# Patient Record
Sex: MALE | Race: WHITE
[De-identification: names, ages, dates, MRNs, and addresses within clinical notes are randomized per-mention and may not be internally consistent; named-entity substitution may affect disease eponyms.]

---

## 2017-06-13 ENCOUNTER — HOSPITAL ENCOUNTER (OUTPATIENT)
Dept: HOSPITAL 75 - RAD | Age: 82
End: 2017-06-13
Attending: INTERNAL MEDICINE
Payer: MEDICARE

## 2017-06-13 DIAGNOSIS — R91.1: Primary | ICD-10-CM

## 2017-06-14 NOTE — DIAGNOSTIC IMAGING REPORT
EXAMINATION: PET-CT 



TECHNIQUE: 

Serum glucose level at the time of the study is: 56 mg/dL.

 11 mCi of FDG was administered intravenously followed by

obtaining PET images with corresponding noncontrast CT scan

images. The CT scan was performed for anatomic correlation and

attenuation correction and was not performed according to the

diagnostic protocol of the areas covered. The scan was performed

from the head to mid thighs.



INDICATION: Left lower lobe lung mass.



FINDINGS:



There is symmetric FDG uptake seen in the brain.



There is no suspicious hypermetabolic activity in the neck seen.



The left lower lobe pulmonary nodule is seen with normal FDG

uptake with associated SUV of about 1.5. It measures the 1.4 cm

in size and when correlated with CT of 8/27/2013, it demonstrates

questionable minimal increase in size. These findings are in

favor of benign etiology such as a hamartoma or noncalcified

granuloma. There is background emphysema changes in the lungs.



IN THE ABDOMEN AND PELVIS:

There is likely physiologic activity seen in bowel loops with

nonfocal uptake mostly in the colon. There is also urinary tract

excretion, expected with no hypermetabolic mass identified.



IMPRESSION: The left lower lobe pulmonary nodule measuring 1.4 cm

is not associated with significant FDG uptake in favor of benign

etiology. It demonstrates minimal growth compared to 2013 CT

scan. It is still favored to be benign such as hamartoma or

noncalcified granuloma. Given the minimal growth, a followup

study in six months with low dose unenhanced CT scan of the chest

is recommended.



Dictated by: 



  Dictated on workstation # [id number]

## 2017-06-22 ENCOUNTER — HOSPITAL ENCOUNTER (OUTPATIENT)
Age: 82
End: 2017-06-22
Payer: MEDICARE

## 2017-06-22 DIAGNOSIS — Z01.818: Primary | ICD-10-CM

## 2017-06-26 ENCOUNTER — HOSPITAL ENCOUNTER (OUTPATIENT)
Dept: HOSPITAL 75 - ENDO | Age: 82
Discharge: HOME | End: 2017-06-26
Attending: SURGERY
Payer: MEDICARE

## 2017-06-26 VITALS — BODY MASS INDEX: 23.54 KG/M2 | HEIGHT: 67 IN | WEIGHT: 150 LBS

## 2017-06-26 VITALS — SYSTOLIC BLOOD PRESSURE: 142 MMHG | DIASTOLIC BLOOD PRESSURE: 64 MMHG

## 2017-06-26 VITALS — SYSTOLIC BLOOD PRESSURE: 139 MMHG | DIASTOLIC BLOOD PRESSURE: 70 MMHG

## 2017-06-26 VITALS — SYSTOLIC BLOOD PRESSURE: 124 MMHG | DIASTOLIC BLOOD PRESSURE: 68 MMHG

## 2017-06-26 VITALS — DIASTOLIC BLOOD PRESSURE: 68 MMHG | SYSTOLIC BLOOD PRESSURE: 124 MMHG

## 2017-06-26 DIAGNOSIS — E11.9: ICD-10-CM

## 2017-06-26 DIAGNOSIS — M19.90: ICD-10-CM

## 2017-06-26 DIAGNOSIS — J44.9: ICD-10-CM

## 2017-06-26 DIAGNOSIS — G47.33: ICD-10-CM

## 2017-06-26 DIAGNOSIS — R63.4: ICD-10-CM

## 2017-06-26 DIAGNOSIS — I10: ICD-10-CM

## 2017-06-26 DIAGNOSIS — I25.10: ICD-10-CM

## 2017-06-26 DIAGNOSIS — K21.0: ICD-10-CM

## 2017-06-26 DIAGNOSIS — E78.5: ICD-10-CM

## 2017-06-26 DIAGNOSIS — K22.2: Primary | ICD-10-CM

## 2017-06-26 PROCEDURE — 87101 SKIN FUNGI CULTURE: CPT

## 2017-06-26 RX ADMIN — MIDAZOLAM HYDROCHLORIDE PRN MG: 1 INJECTION, SOLUTION INTRAMUSCULAR; INTRAVENOUS at 11:23

## 2017-06-26 RX ADMIN — MIDAZOLAM HYDROCHLORIDE PRN MG: 1 INJECTION, SOLUTION INTRAMUSCULAR; INTRAVENOUS at 11:20

## 2017-06-26 RX ADMIN — FENTANYL CITRATE PRN MCG: 50 INJECTION, SOLUTION INTRAMUSCULAR; INTRAVENOUS at 11:24

## 2017-06-26 RX ADMIN — FENTANYL CITRATE PRN MCG: 50 INJECTION, SOLUTION INTRAMUSCULAR; INTRAVENOUS at 11:21

## 2017-06-26 NOTE — XMS REPORT
Continuity of Care Document

 Created on: 2017



Brock Licona

External Reference #: [id number]

: 1935

Sex: Male



Demographics







 Address  [address]

[place]  [address]

 

 Home Phone  [phone]

 

 Preferred Language  English

 

 Marital Status  

 

 Presybeterian Affiliation  CHR

 

 Race  White or 

 

 Ethnic Group  Not  or 





Author







 Author  [facility]

 

 Organization  [facility]

 

 Address  Unknown

 

 Phone  Unavailable







Support







 Name  Relationship  Address  Phone

 

 , Isha Reese  [address]

[place]  [address]  [phone]







Care Team Providers







 Care Team Member Name  Role  Phone

 

 Lopez Elder III  PCP  [phone]







Source Comments

Some departments are not documenting in the electronic medical record.  If you 
do not see the information that you expected, contact Release of Information in 
the Health Information Management department at [phone] for further 
assistance in locating additional records.[facility]



Active Allergies and Adverse Reactions







    



  Allergen   Noted Date   Severity   Reactions   Comments

 

    



  Sulfamethoxazole-Trimetho   2015   Medium   RASH 



  prim    







Current Medications







      



  Prescription   Sig.   Disp.   Refills   Start   End Date   Status



      Date  

 

      



  gemfibrozil (LOPID) 600   Take 600 mg by mouth       Active



  mg tablet   twice daily.     

 

      



  glimepiride (AMARYL) 4 mg   Take 2 mg by mouth twice       Active



  tablet   daily.     

 

      



  lisinopril (PRINIVIL;   Take 10 mg by mouth at       Active



  ZESTRIL) 10 mg tablet   bedtime daily.     

 

      



  fluticasone-salmeterol   Inhale 1 Puff by mouth       Active



  (ADVAIR DISKUS) 100-50   every 12 hours.     



  mcg inhalation disk      

 

      



  vitamins, multiple cap   Take 1 Cap by mouth       Active



   daily.     

 

      



  metFORMIN (GLUCOPHAGE)   Take 1,000 mg by mouth       Active



  500 mg tablet   daily with dinner. Takes     



   500mg with breakfast and     



   1000mg with dinner.     

 

      



  niacin SR (SLO-NIACIN)   Take 750 mg by mouth at       Active



  750 mg Tb24 tablet   bedtime daily.     

 

      



  Aspirin 81 mg Tab   Take 1 Tab by mouth     20    Active



   daily.     12  

 

      



  atorvastatin (LIPITOR) 10   Take 1 Tab by mouth   90 Tab   3   20    
Active



  mg tablet   daily.     12  

 

      



  pantoprazole DR   Take 40 mg by mouth       Active



  (PROTONIX) 40 mg tablet   daily.     







Active Problems







 



  Problem   Noted Date

 

 



  Abnormal cardiovascular stress test   2012

 

 



  HTN (hypertension)   2012

 

 



  COPD (chronic obstructive pulmonary disease) ([facility])   2012

 

 



  HLD (hyperlipidemia)   2012

 

 



  Preoperative cardiovascular examination   2012

 

 



  Aortic heart murmur   2012

 

 



  Peripheral artery disease ([facility])   2012

 

 



  Overview:



  Lower extremity thigh claudication

 

 



  History of endovascular stent graft for abdominal aortic aneurysm   2012

 

 



  Overview:



  Stent placed 

 

 



  CAD (coronary artery disease)   2012

 

 



  Overview:



  [employer] myoview 12 [facility]



  Inferior and inferolateral reversibility



  EF 57%





  12: Bare metal stent PCI to CFX and OMB; 60% LAD, negative by FFR,



  100% occluded RCA - per cath by Dr. Schilling

 

 



  Cancer of kidney ([facility])   10/29/2012

 

 



  Overview:



  Left renal mass noted during AAA surveillance.



  Underwent Left Robotic Partial Nephrectomy on 1/15/2013.



  Final pathology clear cell renal cell carcinoma pT1a, Burke 3, margins



  negative.



  8/17/15:  No evidence of recurrence on ultrasound, labs



  16: No evidence of recurrence on history, exam, labs, CXR, or CT scan





  L



  ast Assessment & Plan:



  Patient doing well.  No evidence of cancer recurrence.  He is now 3 years



  out from resection of  pT1a cancer.  Per AUA and NCCN guidelines, he needs



  no further surveillance imaging for renal cell carcinoma.  He just needs



  yearly physical and exam which may be done by his PCP.  He will follow-up



  with me prn.  Patient is very pleased.



  -- F/U with me prn



  -- Yearly exam and labs by his PCP



  -- All questions answered

 

 



  Type II diabetes mellitus (HCC) 

 

 



  Erectile dysfunction 







Social History







    



  Tobacco Use   Types   Packs/Day   Years Used   Date

 

    



  Former Smoker   Cigarettes   1    Quit: 1996

 

    



  Smokeless Tobacco: Never   



  Used   









   



  Alcohol Use   Drinks/Week   oz/Week   Comments

 

   



  No     very seldom







Last Filed Vital Signs







  



  Vital Sign   Reading   Time Taken

 

  



  Blood Pressure   157/67   2016 11:30 AM CDT

 

  



  Pulse   63   2016 11:30 AM CDT

 

  



  Temperature   36.4   C (97.5   F)   2013 11:00 AM CST

 

  



  Respiratory Rate   -   -

 

  



  Height   1.702 m (5' 7.01")   2016 11:30 AM CDT

 

  



  Weight   77.202 kg (170 lb 3.2 oz)   2016 11:30 AM CDT

 

  



  Body Mass Index   26.65   2016 11:30 AM CDT

 

  



  Oxygen Saturation   97%   2013 11:00 AM CST







Plan of Care







   



  Health Maintenance   Due Date   Last Done   Comments

 

   



  Physical (Comprehensive)   1944  



  Exam   

 

   



  Pertussis Vaccine   1948  

 

   



  Tetanus Vaccine   1954  

 

   



  Dilated Eye Exam   1955  

 

   



  Foot Exam   1955  

 

   



  Hba1c   1955  

 

   



  Microalbumin   1955  

 

   



  Shingles Vaccine   1997  

 

   



  Prevnar/Pneumovax (#1)   2002  

 

   



  Influenza Vaccine   2017  







Results from Last 3 Months

Not on file

## 2017-06-26 NOTE — XMS REPORT
Continuity of Care Document

 Created on: 2017



JOE FERMIN

External Reference #: [id number]

: 1935

Sex: Male



Demographics







 Address  [address] [place] [address]

 

 Home Phone  [phone]

 

 Preferred Language  Unknown

 

 Marital Status  Unknown

 

 Mandaen Affiliation  Unknown

 

 Race  Unknown

 

 Ethnic Group  Unknown





Author







 Author  [facility]

 

 Organization  [facility]

 

 Address  Unknown

 

 Phone  Unavailable



                                                      



Allergies

                      





 Active                    Description                    Code                  
  Type                    Severity                    Reaction                  
  Onset                    Reported/Identified                    Relationship 
to Patient                    Clinical Status                

 

 Yes                    No Known Drug Allergies                    [id number]  
                  Drug Allergy                    Unknown                    N/
A                                         2012                           
                               

 

 Yes                    Sulfa (Sulfonamide Antibiotics)                    
[id number]                    Drug Allergy                    Unknown          
          N/A                                         2017               
                                           



                                                                               
                   



Medications

                                                                               
         



Problems

                      





 Date Dx Coded                    Attending                    Type            
        Code                    Diagnosis                    Diagnosed By      
          

 

 2014                    AMY MEADOWS, PHYLICIA LEWIS                    Ot      
              787.20                                                          

 

 2016                    DIDI AVILES MD                    Ot        
            E11.649                    TYPE 2 DIABETES MELLITUS WITH HYPOGLYCEM
                                     

 

 2016                    DIDI AVILES MD                    Ot        
            E78.0                    PURE HYPERCHOLESTEROLEMIA                 
                    

 

 2016                    DIDI AVILES MD                    Ot        
            E86.0                    DEHYDRATION                               
      

 

 2016                    DIDI AVILES MD                    Ot        
            E87.1                    HYPO-OSMOLALITY AND HYPONATREMIA          
                           

 

 2016                    DIDI AVILES MD                    Ot        
            G47.30                    SLEEP APNEA, UNSPECIFIED                 
                    

 

 2016                    DIDI AVILES MD                    Ot        
            I10                                                          

 

 2016                    DIDI AVILES MD                    Ot        
            I12.9                    HYPERTENSIVE CHRONIC KIDNEY DISEASE W ST  
                                   

 

 2016                    DIDI AVILES MD                    Ot        
            I25.10                    ATHSCL HEART DISEASE OF NATIVE CORONARY  
                                    

 

 2016                    DIDI AVILES MD                    Ot        
            J18.9                                                          

 

 2016                    DIDI AVILES MD                    Ot        
            J44.9                    CHRONIC OBSTRUCTIVE PULMONARY DISEASE, U  
                                   

 

 2016                    DIDI AVILES MD                    Ot        
            K21.9                    GASTRO-ESOPHAGEAL REFLUX DISEASE WITHOUT  
                                   

 

 2016                    DIDI AVILES MD                    Ot        
            N17.9                    ACUTE KIDNEY FAILURE, UNSPECIFIED         
                            

 

 2016                    DIDI AVILES MD                    Ot        
            N18.9                    CHRONIC KIDNEY DISEASE, UNSPECIFIED       
                              

 

 2016                    DIDI AVILES MD                    Ot        
            Z87.891                    PERSONAL HISTORY OF NICOTINE DEPENDENCE 
                                    

 

 2016                    DIDI AVILES MD                    Ot        
            Z95.5                    PRESENCE OF CORONARY ANGIOPLASTY IMPLANT  
                                   

 

 01/10/2017                    TAWIL MD, EMILIANO LR                    Ot         
           C64.2                    MALIGNANT NEOPLASM OF LEFT KIDNEY, EXCEP   
                                  

 

 2017                    TAWIL MD, EMILIANO LR                    Ot         
           C64.2                    MALIGNANT NEOPLASM OF LEFT KIDNEY, EXCEP   
                                  

 

 2017                    BILL GIVENS DO                    Ot    
                R91.8                    OTHER NONSPECIFIC ABNORMAL FINDING OF 
JAYDEN                                     

 

 2017                    BILL GIVENS DO                    Ot    
                R91.8                    OTHER NONSPECIFIC ABNORMAL FINDING OF 
JAYDEN                                     

 

 2017                    BILL GIVENS DO                    Ot    
                R91.1                    SOLITARY PULMONARY NODULE             
                        

 

 2017                    BILL GIVENS DO                    Ot    
                R91.1                    SOLITARY PULMONARY NODULE             
                        



                                                                               
                                                                               
                                                                               
                                                             



Procedures

                                                                               
         



Results

                                                                    



Encounters

                      





 ACCT No.                    Visit Date/Time                    Discharge      
              Status                    Pt. Type                    Provider   
                 Facility                    Loc./Unit                    
Complaint                

 

 [id number]                    2017 06:30:00                    2017 15:35:00                    DIS                    Outpatient             
       JOSEPH MEADOWS, KALI CUTLER                    [facility]
                    PREOP                    WEIGHT LOSS                

 

 [id number]                    2016 02:51:00                    2016 13:17:00                    DIS                    Inpatient              
      STEFAN MEADOWS, DIDI DU                    [facility]                                     

 

 [id number]                    2014 06:59:00                    2014 10:00:00                    DIS                    Outpatient             
       PHYLICIA REYES MD                    [facility]                                     

 

 [id number]                    2014 12:24:00                    2014 23:59:59                    CLS                    Outpatient             
       PHYLICIA REYES MD                    [facility]
                    PREOP                                     

 

 [id number]                    2014 06:24:00                    2014 09:32:00                    DIS                    Outpatient             
                                                                               
        

 

 [id number]                    2014 15:32:00                    2014 23:59:59                    CLS                    Outpatient             
                                                                               
        

 

 [id number]                    2014 07:17:00                    2014 09:55:00                    DIS                    Outpatient             
                                                                               
        

 

 [id number]                    2014 07:24:00                    2014 23:59:59                    CLS                    Outpatient             
                                                                               
        

 

 [id number]                    2014 07:10:00                    2014 23:59:59                    CLS                    Outpatient             
                                                                               
        

 

 [id number]                    2014 07:43:00                    2014 23:59:59                    CLS                    Outpatient             
                                                                               
        

 

 [id number]                    2013 06:33:00                    2013 09:25:00                    DIS                    Outpatient             
                                                                               
        

 

 [id number]                    2013 07:12:00                    2013 23:59:59                    CLS                    Outpatient             
                                                                               
        

 

 [id number]                    10/31/2013 09:59:00                    10/31/
2013 23:59:59                    CLS                    Outpatient             
                                                                               
        

 

 [id number]                    2013 06:57:00                    2013 23:59:59                    CLS                    Outpatient             
                                                                               
        

 

 [id number]                    2017 11:15:00                           
              PEN                    Preadmit                    JOSEPH MEADOWS, 
KALI CUTLER                    [facility] [place]                    
ENDO                    WEIGHT LOSS                

 

 [id number]                    2017 09:01:00                           
              ACT                    Outpatient                    BILL GIVENS DO                    [facility]                  
  RAD                    SEVERE WEIGHT LOSS                

 

 [id number]                    2016 07:26:00                           
              ACT                    Outpatient                    TAWIL MD, 
EMILIANO LR                    [facility] [place]                    
RAD                    LT RENAL CELL CARCINOMA

## 2017-06-26 NOTE — ENDOSCOPY PROCEDURE REPORT
Endoscopy Report


Date:  Jun 26, 2017


Preoperative Diagnosis:  dysphagia.  Weight loss


Study Performed:  Upper Endoscopy


Procedure Instrument:  Endoscope





Endo Procedure/Findings


Findings


1.:  Vascular Ectasias, Stricture





Copy


Copies To 1:   BILL GIVENS XAVIER M MD Jun 26, 2017 11:54 am

## 2017-06-26 NOTE — DISCHARGE INST-SIMPLE/STANDARD
Discharge Inst-Standard


Discharge Medications


New, Converted or Re-Newed RX:  Other





Patient Instructions/Follow Up


Plan of Care/Instructions/FU:  


follow-up with me in 10 days to review pathology results


Activity as Tolerated:  Yes


Discharge Diet:  No Restrictions











KALI RUIZ MD Jun 26, 2017 11:56 am

## 2017-06-26 NOTE — OPERATIVE REPORT
DATE OF SERVICE:  06/26/2017



PROCEDURE:

1.  Upper GI endoscopy.

2.  Biopsy of distal esophageal lesion.

3.  Brush cytology of distal esophagus.

4.  Balloon dilatation of esophageal stricture.



SURGEON:

Kali Ruiz MD.



INDICATION FOR PROCEDURE:

This gentleman has a history of esophageal candidiasis and had undergone

Nissen fundoplication to manage reflux disease.  He reported dysphagia and

progressive loss of weight.  Therefore, an upper endoscopy was felt to be

reasonable.



Informed consent was obtained after reviewing the procedure in detail.



DESCRIPTION OF PROCEDURE:

He was placed in left lateral decubitus position and his vital signs were

monitored.  Conscious sedation was achieved using Versed and fentanyl.  The

flexible gastroscope was introduced down the esophagus, past the stomach

into the proximal duodenum.



FINDINGS:

Esophagus:  

1. Quite tortuous with a smooth stricture at the distal end.

2.  Irregular area of the mucosa, about a cm proximal to the stricture.  It may

be a remnant of previous candidiasis.  Brush cytology and biopsy were obtained.



Subsequently the stricture was dilated to 17 mm with the balloon.



Stomach:

Placed a very small AV malformation was found in the distal stomach.



Duodenum:  

Normal.



He tolerated the procedure well and was taken back to the nursing area in a

stable condition.



IMPRESSION:

1.  Weight loss.

2.  Dysphagia due to stricture.

3.  Distal esophageal lesion, possibly candidiasis.  Biopsies and cytology

results pending.





Job ID: [id number]

DocumentID: [id number]

Dictated Date:  06/26/2017 11:52:00

Transcription Date: 06/26/2017 20:35:18

Dictated By: KALI RUIZ MD

MTDD

## 2017-12-20 ENCOUNTER — HOSPITAL ENCOUNTER (OUTPATIENT)
Dept: HOSPITAL 75 - RAD | Age: 82
End: 2017-12-20
Attending: UROLOGY
Payer: MEDICARE

## 2017-12-20 DIAGNOSIS — R91.1: ICD-10-CM

## 2017-12-20 DIAGNOSIS — Z85.528: ICD-10-CM

## 2017-12-20 DIAGNOSIS — Z08: Primary | ICD-10-CM

## 2017-12-20 PROCEDURE — 76700 US EXAM ABDOM COMPLETE: CPT

## 2017-12-20 PROCEDURE — 71020: CPT

## 2017-12-20 NOTE — DIAGNOSTIC IMAGING REPORT
PROCEDURE: US abdomen complete.



TECHNIQUE: Multiple real-time grayscale images were obtained over

the abdomen in various projections.



INDICATION: History of renal cell carcinoma in the left kidney.



FINDINGS:



The pancreas is obscured.



The liver is fairly homogeneous with no focal lesion. The

gallbladder has been removed. The CBD is obscured by bowel gas.

Hepatopetal flow in the portal vein is seen.



The right kidney is 10 cm and the left kidney is 9 cm in length.

There is no hydronephrosis or focal mass identified. The spleen

is 9.9 cm in length. The aorta and IVC are obscured by bowel gas.

No fluid collection is seen in the abdomen.



IMPRESSION: No definite abnormality.



Dictated by: 



  Dictated on workstation # [id number]

## 2017-12-20 NOTE — DIAGNOSTIC IMAGING REPORT
EXAMINATION: PA and lateral views of the chest.



INDICATION: Shortness of breath.



FINDINGS:

The lungs are clear of focal infiltrates. There is a 1.4 cm left

lower lobe pulmonary nodule better seen on the lateral

projection. The heart size is normal. No effusion or

pneumothorax.



The mediastinum and lee appear unremarkable.



IMPRESSION: Unchanged 1.4 cm left lower lobe nodule.



Dictated by: 



  Dictated on workstation # [id number]

## 2019-10-16 ENCOUNTER — HOSPITAL ENCOUNTER (OUTPATIENT)
Dept: HOSPITAL 75 - PREOP | Age: 84
Discharge: HOME | End: 2019-10-16
Attending: SURGERY
Payer: MEDICARE

## 2019-10-16 VITALS — BODY MASS INDEX: 26.09 KG/M2 | HEIGHT: 66.93 IN | WEIGHT: 166.23 LBS

## 2019-10-16 DIAGNOSIS — Z01.818: Primary | ICD-10-CM
